# Patient Record
Sex: MALE | Race: WHITE | Employment: OTHER | URBAN - METROPOLITAN AREA
[De-identification: names, ages, dates, MRNs, and addresses within clinical notes are randomized per-mention and may not be internally consistent; named-entity substitution may affect disease eponyms.]

---

## 2018-04-03 ENCOUNTER — APPOINTMENT (OUTPATIENT)
Dept: ULTRASOUND IMAGING | Age: 78
End: 2018-04-03
Attending: INTERNAL MEDICINE
Payer: MEDICARE

## 2018-04-03 ENCOUNTER — HOSPITAL ENCOUNTER (OUTPATIENT)
Age: 78
Setting detail: OBSERVATION
Discharge: HOME | End: 2018-04-04
Attending: EMERGENCY MEDICINE | Admitting: INTERNAL MEDICINE
Payer: MEDICARE

## 2018-04-03 DIAGNOSIS — G45.9 TRANSIENT CEREBRAL ISCHEMIA, UNSPECIFIED TYPE: Primary | ICD-10-CM

## 2018-04-03 PROBLEM — R47.01 EXPRESSIVE APHASIA: Status: ACTIVE | Noted: 2018-04-03

## 2018-04-03 PROBLEM — H40.9 GLAUCOMA: Status: ACTIVE | Noted: 2018-04-03

## 2018-04-03 LAB
HGB BLD-MCNC: 14.9 G/DL (ref 13.6–17.2)
INR PPP: 1
PLATELET # BLD AUTO: 216 K/UL (ref 150–450)
PROTHROMBIN TIME: 12.7 SEC (ref 11.5–14.5)

## 2018-04-03 PROCEDURE — 93880 EXTRACRANIAL BILAT STUDY: CPT

## 2018-04-03 PROCEDURE — 85049 AUTOMATED PLATELET COUNT: CPT | Performed by: INTERNAL MEDICINE

## 2018-04-03 PROCEDURE — 99218 HC RM OBSERVATION: CPT

## 2018-04-03 PROCEDURE — 82565 ASSAY OF CREATININE: CPT | Performed by: INTERNAL MEDICINE

## 2018-04-03 PROCEDURE — 85018 HEMOGLOBIN: CPT | Performed by: INTERNAL MEDICINE

## 2018-04-03 PROCEDURE — 77030032490 HC SLV COMPR SCD KNE COVD -B

## 2018-04-03 PROCEDURE — 77030027138 HC INCENT SPIROMETER -A

## 2018-04-03 PROCEDURE — 74011250637 HC RX REV CODE- 250/637: Performed by: INTERNAL MEDICINE

## 2018-04-03 PROCEDURE — 99284 EMERGENCY DEPT VISIT MOD MDM: CPT | Performed by: EMERGENCY MEDICINE

## 2018-04-03 PROCEDURE — 85610 PROTHROMBIN TIME: CPT | Performed by: EMERGENCY MEDICINE

## 2018-04-03 RX ORDER — GUAIFENESIN 100 MG/5ML
81 LIQUID (ML) ORAL DAILY
Status: DISCONTINUED | OUTPATIENT
Start: 2018-04-04 | End: 2018-04-04 | Stop reason: HOSPADM

## 2018-04-03 RX ORDER — LATANOPROST 50 UG/ML
1 SOLUTION/ DROPS OPHTHALMIC
COMMUNITY

## 2018-04-03 RX ORDER — ACETAMINOPHEN 325 MG/1
650 TABLET ORAL
Status: DISCONTINUED | OUTPATIENT
Start: 2018-04-03 | End: 2018-04-04 | Stop reason: HOSPADM

## 2018-04-03 RX ORDER — ATORVASTATIN CALCIUM 40 MG/1
40 TABLET, FILM COATED ORAL
Status: DISCONTINUED | OUTPATIENT
Start: 2018-04-03 | End: 2018-04-04 | Stop reason: HOSPADM

## 2018-04-03 RX ORDER — BISACODYL 5 MG
5 TABLET, DELAYED RELEASE (ENTERIC COATED) ORAL DAILY PRN
Status: DISCONTINUED | OUTPATIENT
Start: 2018-04-03 | End: 2018-04-04 | Stop reason: HOSPADM

## 2018-04-03 RX ORDER — TIMOLOL MALEATE 5 MG/ML
1 SOLUTION OPHTHALMIC DAILY
COMMUNITY

## 2018-04-03 RX ORDER — HYDRALAZINE HYDROCHLORIDE 20 MG/ML
10 INJECTION INTRAMUSCULAR; INTRAVENOUS
Status: DISCONTINUED | OUTPATIENT
Start: 2018-04-03 | End: 2018-04-04 | Stop reason: HOSPADM

## 2018-04-03 RX ORDER — SODIUM CHLORIDE 0.9 % (FLUSH) 0.9 %
5-10 SYRINGE (ML) INJECTION EVERY 8 HOURS
Status: DISCONTINUED | OUTPATIENT
Start: 2018-04-03 | End: 2018-04-04 | Stop reason: HOSPADM

## 2018-04-03 RX ORDER — SODIUM CHLORIDE 0.9 % (FLUSH) 0.9 %
5-10 SYRINGE (ML) INJECTION AS NEEDED
Status: DISCONTINUED | OUTPATIENT
Start: 2018-04-03 | End: 2018-04-04 | Stop reason: HOSPADM

## 2018-04-03 RX ORDER — ENOXAPARIN SODIUM 100 MG/ML
40 INJECTION SUBCUTANEOUS EVERY 24 HOURS
Status: DISCONTINUED | OUTPATIENT
Start: 2018-04-03 | End: 2018-04-04 | Stop reason: HOSPADM

## 2018-04-03 RX ADMIN — ATORVASTATIN CALCIUM 40 MG: 40 TABLET, FILM COATED ORAL at 21:36

## 2018-04-03 RX ADMIN — Medication 5 ML: at 19:00

## 2018-04-03 RX ADMIN — Medication 10 ML: at 21:36

## 2018-04-03 NOTE — ED TRIAGE NOTES
Per ems report patient to ed from Emergency MD due to speech difficulty that started around 1140am today which resolved spontaneously. Ems states patient began having tunnel vision at emergency md without any speech issues at approximately 115pm, Patient states all symptoms have since resolved. EMS states CT scan at emergency md was normal. Patient has a 20 G IV in his right forearm. Patient denies any symptoms at this time. A results from emergency MD at bedside.

## 2018-04-03 NOTE — ED PROVIDER NOTES
HPI     This is a 76-year-old male presenting to the emergency department for evaluation of concern for TIA. The patient was seen initially at [facility] for an episode of difficulty with  Speech and inability to express the words that he was thinking. His symptoms resolved spontaneously. He was evaluated in emergency and he had a CT of his brain which was unremarkable, he had normal labs. He received a full dose aspirin. At that time they were discussing outpatient follow-up and treatment when he began having symptoms again of difficulty reading text messages and understanding the words he was looking at. This lasted for a couple of minutes as well and then resolve spontaneously. Because of the stuttering symptoms, he was sent to the emergency department for further evaluation. The patient comes to the emergency department with paperwork of his laboratory results as well as a CT scanthere are no alleviating or exacerbating symptoms. No weakness in extremities. The patient also notes that he has a remote history of being \"manic depressive\". He states earlier today he was feeling very giddy and telling a lot of jokes and feeling as though he might be manic which is not felt in 15-20 years. He states he does not know if this is pertinent or not, but this too has resolved. Past Medical History:   Diagnosis Date    Glaucoma     Pulmonary embolism (HCC)        Past Surgical History:   Procedure Laterality Date    HX CHOLECYSTECTOMY      HX TONSILLECTOMY      HX VASECTOMY           No family history on file. Social History     Social History    Marital status: N/A     Spouse name: N/A    Number of children: N/A    Years of education: N/A     Occupational History    Not on file.      Social History Main Topics    Smoking status: Never Smoker    Smokeless tobacco: Never Used    Alcohol use Yes      Comment: special occasions    Drug use: No    Sexual activity: Not on file     Other Topics Concern    Not on file     Social History Narrative    No narrative on file         ALLERGIES: Review of patient's allergies indicates no known allergies. Review of Systems   Constitutional: Negative for fatigue and fever. HENT: Negative. Eyes: Negative. Respiratory: Negative for cough, chest tightness, shortness of breath and wheezing. Cardiovascular: Negative for chest pain. Gastrointestinal: Negative for abdominal distention, abdominal pain, diarrhea, nausea and vomiting. Genitourinary: Negative for dysuria, flank pain, frequency, genital sores and urgency. Musculoskeletal: Negative. Skin: Negative for rash. Neurological: Positive for speech difficulty. Negative for dizziness, tremors, syncope, facial asymmetry, weakness and headaches. All other systems reviewed and are negative. Vitals:    04/03/18 1503   BP: 164/84   Pulse: 69   Resp: 16   Temp: 98 °F (36.7 °C)   SpO2: 98%   Weight: 99.8 kg (220 lb)   Height: 6' 2\" (1.88 m)            Physical Exam   Constitutional: He is oriented to person, place, and time. He appears well-developed and well-nourished. No distress. HENT:   Head: Normocephalic and atraumatic. Eyes: EOM are normal. Pupils are equal, round, and reactive to light. Neck: Normal range of motion. Cardiovascular: Normal rate, regular rhythm and normal heart sounds. No murmur heard. Pulmonary/Chest: Effort normal and breath sounds normal. No respiratory distress. He has no wheezes. He has no rales. Abdominal: Soft. He exhibits no distension. There is no tenderness. There is no rebound. Musculoskeletal: He exhibits no edema, tenderness or deformity. Neurological: He is alert and oriented to person, place, and time. No cranial nerve deficit. Coordination normal.   5+ strength in all distributions, sensation intactThomas finger-nose-finger intact, normal gait, normal tandem gait, negative Romberg   Skin: Skin is warm and dry. No erythema.    Psychiatric: He has a normal mood and affect. His behavior is normal.   Vitals reviewed. MDM  Number of Diagnoses or Management Options  Transient cerebral ischemia, unspecified type:   Diagnosis management comments: Differential diagnosis: Acute stroke, TIA,    Patient presents to the ER symptoms that sound most consistent with TIA with complete resolution of symptoms at this time. He has a normal neurological examination. Given his stuttering symptoms, I think he would benefit from inpatient evaluation including MRI and risk factor modification. I discussed this with the hospitalist who has agreed to admit for further treatment and care. He does present with records from outside hospital which had been placed on the chart. These show unremarkable labs and a CT of the brain which shows no evidence of hemorrhage.        Amount and/or Complexity of Data Reviewed  Decide to obtain previous medical records or to obtain history from someone other than the patient: yes    Risk of Complications, Morbidity, and/or Mortality  Presenting problems: high  Diagnostic procedures: high  Management options: high          ED Course       Procedures

## 2018-04-03 NOTE — IP AVS SNAPSHOT
Summary of Care Report The Summary of Care report has been created to help improve care coordination. Users with access to [employer] or [facility] [facility] (Web-based application) may access additional patient information including the Discharge Summary. If you are not currently a [address] user and need more information, please call the number listed below in the [place] [address] section and ask to be connected with Medical Records. Facility Information Name Address Phone [address] [address] [address] [phone] Patient Information Patient Name Sex YANIQUE Quinones ([id number]) Male 1940 Discharge Information Admitting Provider Service Area Unit Jose Kilpatrick MD / [address] 7 Med Surg / [phone] Discharge Provider Discharge Date/Time Discharge Disposition Destination (none) 2018 Midday (Pending) AHR (none) Patient Language Language ENGLISH [13] Hospital Problems as of 2018  Never Reviewed Class Noted - Resolved Last Modified POA Active Problems * (Principal)TIA (transient ischemic attack)  4/3/2018 - Present 4/3/2018 by Jose Kilpatrick MD Yes Entered by Jose Kilpatrick MD  
  Glaucoma  4/3/2018 - Present 4/3/2018 by Jose Kilpatrick MD Yes Entered by Jose Kilpatrick MD  
  Expressive aphasia  4/3/2018 - Present 4/3/2018 by Jose Kilpatrick MD Yes Entered by Jose Kilpatrick MD  
  
You are allergic to the following No active allergies Current Discharge Medication List  
  
START taking these medications Dose & Instructions Dispensing Information Comments  
 atorvastatin 40 mg tablet Commonly known as:  LIPITOR Dose:  40 mg Take 1 Tab by mouth nightly. Quantity:  30 Tab Refills:  0 CONTINUE these medications which have NOT CHANGED Dose & Instructions Dispensing Information Comments  
 aspirin 81 mg chewable tablet Dose:  81 mg Take 81 mg by mouth daily. Refills:  0  
   
 FISH  mg Cap Generic drug:  Omega-3 Fatty Acids Dose:  1 Caplet Take 1 Caplet by mouth. Refills:  0  
   
 latanoprost 0.005 % ophthalmic solution Commonly known as:  Dot Dux Dose:  1 Drop Administer 1 Drop to both eyes nightly. Refills:  0  
   
 timolol 0.5 % ophthalmic gel-forming Commonly known as:  TIMOPTIC-XE Dose:  1 Drop Administer 1 Drop to both eyes daily. Refills:  0 Follow-up Information Follow up With Details Comments Contact Info Will set up new provider In 3 days Hospital follow up and elevated blood pressure Patient will be contacted with name of provider Discharge Instructions Transient Ischemic Attack: Care Instructions Your Care Instructions A transient ischemic attack (TIA) is when blood flow to a part of your brain is blocked for a short time. A TIA is like a stroke but usually lasts only a few minutes. A TIA does not cause lasting brain damage. Any vision problems, slurred speech, or other symptoms usually go away in 10 to 20 minutes. But they may last for up to 24 hours. TIAs are often warning signs of a stroke. Some people who have a TIA may have a stroke in the future. A stroke can cause symptoms like those of a TIA. But a stroke causes lasting damage to your brain. You can take steps to help prevent a stroke. One thing you can do is get early treatment. If you have other new symptoms, or if your symptoms do not get better, go back to the emergency room or call your doctor right away. Getting treatment right away may prevent long-term brain damage caused by a stroke. The doctor has checked you carefully, but problems can develop later. If you notice any problems or new symptoms, get medical treatment right away. Follow-up care is a key part of your treatment and safety. Be sure to make and go to all appointments, and call your doctor if you are having problems. It's also a good idea to know your test results and keep a list of the medicines you take. How can you care for yourself at home? Medicines ? · Be safe with medicines. Take your medicines exactly as prescribed. Call your doctor if you think you are having a problem with your medicine. ? · If you take a blood thinner, such as aspirin, be sure you get instructions about how to take your medicine safely. Blood thinners can cause serious bleeding problems. ? · Call your doctor if you are not able to take your medicines for any reason. ? · Do not take any over-the-counter medicines or herbal products without talking to your doctor first.  
? · If you take birth control pills or hormone therapy, talk to your doctor. Ask if these treatments are right for you. ? Lifestyle changes ? · Do not smoke. If you need help quitting, talk to your doctor about stop-smoking programs and medicines. ? · Be active. If your doctor recommends it, get more exercise. Walking is a good choice. Bit by bit, increase the amount you walk every day. Try for at least 30 minutes on most days of the week. You also may want to swim, bike, or do other activities. ? · Eat heart-healthy foods. These include fruits, vegetables, high-fiber foods, fish, and foods that are low in sodium, saturated fat, and trans fat. ? · Stay at a healthy weight. Lose weight if you need to.  
? · Limit alcohol to 2 drinks a day for men and 1 drink a day for women. ?Staying healthy ? · Manage other health problems such as diabetes, high blood pressure, and high cholesterol. ? · Get the flu vaccine every year. When should you call for help? Call 911 anytime you think you may need emergency care. For example, call if: 
? · You have new or worse symptoms of a stroke. These may include: ¨ Sudden numbness, tingling, weakness, or loss of movement in your face, arm, or leg, especially on only one side of your body. ¨ Sudden vision changes. ¨ Sudden trouble speaking. ¨ Sudden confusion or trouble understanding simple statements. ¨ Sudden problems with walking or balance. ¨ A sudden, severe headache that is different from past headaches. Call 911 even if these symptoms go away in a few minutes. ? · You feel like you are having another TIA. ? Watch closely for changes in your health, and be sure to contact your doctor if you have any problems. Where can you learn more? Go to http://[employer].info/. Enter (01) [phone] in the search box to learn more about \"Transient Ischemic Attack: Care Instructions. \" Current as of: March 20, 2017 Content Version: 11.4 © 1989-0736 [employer]. Care instructions adapted under license by [employer] (which disclaims liability or warranty for this information). If you have questions about a medical condition or this instruction, always ask your healthcare professional. [place] [address] any warranty or liability for your use of this information. DISCHARGE SUMMARY from Nurse PATIENT INSTRUCTIONS: 
 
 
F-face looks uneven A-arms unable to move or move unevenly S-speech slurred or non-existent T-time-call 911 as soon as signs and symptoms begin-DO NOT go Back to bed or wait to see if you get better-TIME IS BRAIN. Warning Signs of HEART ATTACK Call 911 if you have these symptoms: 
? Chest discomfort. Most heart attacks involve discomfort in the center of the chest that lasts more than a few minutes, or that goes away and comes back. It can feel like uncomfortable pressure, squeezing, fullness, or pain. ? Discomfort in other areas of the upper body. Symptoms can include pain or discomfort in one or both arms, the back, neck, jaw, or stomach. ? Shortness of breath with or without chest discomfort. ? Other signs may include breaking out in a cold sweat, nausea, or lightheadedness. Don't wait more than five minutes to call [address]! Fast action can save your life. Calling 911 is almost always the fastest way to get lifesaving treatment. Emergency Medical Services staff can begin treatment when they arrive  up to an hour sooner than if someone gets to the hospital by car. The discharge information has been reviewed with the patient. The patient verbalized understanding. Discharge medications reviewed with the patient and appropriate educational materials and side effects teaching were provided. ___________________________________________________________________________________________________________________________________ Chart Review Routing History No Routing History on File

## 2018-04-03 NOTE — IP AVS SNAPSHOT
[address] 
[phone] Patient: Dangelo White MRN: [id number] YXT:[id number] About your hospitalization You were admitted on:  April 3, 2018 You last received care in the:  [facility] 7 MED SURG You were discharged on:  April 4, 2018 Why you were hospitalized Your primary diagnosis was:  Tia (Transient Ischemic Attack) Your diagnoses also included:  Glaucoma, Expressive Aphasia Follow-up Information Follow up With Details Comments Contact Info Will set up new provider In 3 days Hospital follow up and elevated blood pressure Patient will be contacted with name of provider Discharge Orders None A check alida indicates which time of day the medication should be taken. My Medications START taking these medications Instructions Each Dose to Equal  
 Morning Noon Evening Bedtime  
 atorvastatin 40 mg tablet Commonly known as:  LIPITOR Your next dose is: Take tonight Take 1 Tab by mouth nightly. 40 mg CONTINUE taking these medications Instructions Each Dose to Equal  
 Morning Noon Evening Bedtime  
 aspirin 81 mg chewable tablet Your next dose is:  Tomorrow Morning Take 81 mg by mouth daily. 81 mg FISH  mg Cap Generic drug:  Omega-3 Fatty Acids Your next dose is:  Tomorrow Morning Take 1 Caplet by mouth. 1 Caplet  
    
  
   
   
   
  
 latanoprost 0.005 % ophthalmic solution Commonly known as:  Marlyse Billow Your next dose is: Take tonight Administer 1 Drop to both eyes nightly. 1 Drop  
    
   
   
   
  
  
 timolol 0.5 % ophthalmic gel-forming Commonly known as:  TIMOPTIC-XE Your next dose is:  Resume home schedule Administer 1 Drop to both eyes daily. 1 Drop Where to Get Your Medications Information on where to get these meds will be given to you by the nurse or doctor. ! Ask your nurse or doctor about these medications  
  atorvastatin 40 mg tablet Discharge Instructions Transient Ischemic Attack: Care Instructions Your Care Instructions A transient ischemic attack (TIA) is when blood flow to a part of your brain is blocked for a short time. A TIA is like a stroke but usually lasts only a few minutes. A TIA does not cause lasting brain damage. Any vision problems, slurred speech, or other symptoms usually go away in 10 to 20 minutes. But they may last for up to 24 hours. TIAs are often warning signs of a stroke. Some people who have a TIA may have a stroke in the future. A stroke can cause symptoms like those of a TIA. But a stroke causes lasting damage to your brain. You can take steps to help prevent a stroke. One thing you can do is get early treatment. If you have other new symptoms, or if your symptoms do not get better, go back to the emergency room or call your doctor right away. Getting treatment right away may prevent long-term brain damage caused by a stroke. The doctor has checked you carefully, but problems can develop later. If you notice any problems or new symptoms, get medical treatment right away. Follow-up care is a key part of your treatment and safety. Be sure to make and go to all appointments, and call your doctor if you are having problems. It's also a good idea to know your test results and keep a list of the medicines you take. How can you care for yourself at home? Medicines ? · Be safe with medicines. Take your medicines exactly as prescribed. Call your doctor if you think you are having a problem with your medicine. ? · If you take a blood thinner, such as aspirin, be sure you get instructions about how to take your medicine safely. Blood thinners can cause serious bleeding problems. ? · Call your doctor if you are not able to take your medicines for any reason. ? · Do not take any over-the-counter medicines or herbal products without talking to your doctor first.  
? · If you take birth control pills or hormone therapy, talk to your doctor. Ask if these treatments are right for you. ? Lifestyle changes ? · Do not smoke. If you need help quitting, talk to your doctor about stop-smoking programs and medicines. ? · Be active. If your doctor recommends it, get more exercise. Walking is a good choice. Bit by bit, increase the amount you walk every day. Try for at least 30 minutes on most days of the week. You also may want to swim, bike, or do other activities. ? · Eat heart-healthy foods. These include fruits, vegetables, high-fiber foods, fish, and foods that are low in sodium, saturated fat, and trans fat. ? · Stay at a healthy weight. Lose weight if you need to.  
? · Limit alcohol to 2 drinks a day for men and 1 drink a day for women. ?Staying healthy ? · Manage other health problems such as diabetes, high blood pressure, and high cholesterol. ? · Get the flu vaccine every year. When should you call for help? Call 911 anytime you think you may need emergency care. For example, call if: 
? · You have new or worse symptoms of a stroke. These may include: 
¨ Sudden numbness, tingling, weakness, or loss of movement in your face, arm, or leg, especially on only one side of your body. ¨ Sudden vision changes. ¨ Sudden trouble speaking. ¨ Sudden confusion or trouble understanding simple statements. ¨ Sudden problems with walking or balance. ¨ A sudden, severe headache that is different from past headaches. Call 911 even if these symptoms go away in a few minutes. ? · You feel like you are having another TIA. ? Watch closely for changes in your health, and be sure to contact your doctor if you have any problems. Where can you learn more? Go to http://rhett.info/. Enter [phone] in the search box to learn more about \"Transient Ischemic Attack: Care Instructions. \" Current as of: March 20, 2017 Content Version: 11.4 © 5101-6981 [employer]. Care instructions adapted under license by [employer] (which disclaims liability or warranty for this information). If you have questions about a medical condition or this instruction, always ask your healthcare professional. [address] any warranty or liability for your use of this information. DISCHARGE SUMMARY from Nurse PATIENT INSTRUCTIONS: 
 
 
F-face looks uneven A-arms unable to move or move unevenly S-speech slurred or non-existent T-time-call 911 as soon as signs and symptoms begin-DO NOT go Back to bed or wait to see if you get better-TIME IS BRAIN. Warning Signs of HEART ATTACK Call 911 if you have these symptoms: 
? Chest discomfort. Most heart attacks involve discomfort in the center of the chest that lasts more than a few minutes, or that goes away and comes back. It can feel like uncomfortable pressure, squeezing, fullness, or pain. ? Discomfort in other areas of the upper body. Symptoms can include pain or discomfort in one or both arms, the back, neck, jaw, or stomach. ? Shortness of breath with or without chest discomfort. ? Other signs may include breaking out in a cold sweat, nausea, or lightheadedness. Don't wait more than five minutes to call [address]! Fast action can save your life. Calling 911 is almost always the fastest way to get lifesaving treatment. Emergency Medical Services staff can begin treatment when they arrive  up to an hour sooner than if someone gets to the hospital by car. The discharge information has been reviewed with the patient. The patient verbalized understanding. Discharge medications reviewed with the patient and appropriate educational materials and side effects teaching were provided. ___________________________________________________________________________________________________________________________________ [employer] Announcement We are excited to announce that we are making your provider's discharge notes available to you in MicroPhage. You will see these notes when they are completed and signed by the physician that discharged you from your recent hospital stay. If you have any questions or concerns about any information you see in MicroPhage, please call the Health Information Department where you were seen or reach out to your Primary Care Provider for more information about your plan of care. Introducing [facility]! Leta Gonzalez introduces MicroPhage patient portal. Now you can access parts of your medical record, email your doctor's office, and request medication refills online. 1. In your internet browser, go to [email or web address] 2. Click on the First Time User? Click Here link in the Sign In box. You will see the New Member Sign Up page. 3. Enter your MicroPhage Access Code exactly as it appears below. You will not need to use this code after youve completed the sign-up process. If you do not sign up before the expiration date, you must request a new code. · MicroPhage Access Code: [email or web address] Expires: 7/2/2018  2:59 PM 
 
4. Enter the last four digits of your Social Security Number (xxxx) and Date of Birth (mm/dd/yyyy) as indicated and click Submit. You will be taken to the next sign-up page. 5. Create a MicroPhage ID. This will be your MyChart login ID and cannot be changed, so think of one that is secure and easy to remember. 6. Create a MicroPhage password. You can change your password at any time. 7. Enter your Password Reset Question and Answer. This can be used at a later time if you forget your password. 8. Enter your e-mail address. You will receive e-mail notification when new information is available in [address] 9. Click Sign Up. You can now view and download portions of your medical record. 10. Click the Download Summary menu link to download a portable copy of your medical information. If you have questions, please visit the Frequently Asked Questions section of the [employer] website. Remember, [employer] is NOT to be used for urgent needs. For medical emergencies, dial 911. Now available from your iPhone and Android! Introducing Carlos López As a [employer] patient, I wanted to make you aware of our electronic visit tool called Carlos Rene. Diane Condon 24[employer] allows you to connect within minutes with a medical provider 24 hours a day, seven days a week via a mobile device or tablet or logging into a secure website from your computer. You can access Carlos López from anywhere in the [place]. A virtual visit might be right for you when you have a simple condition and feel like you just dont want to get out of bed, or cant get away from work for an appointment, when your regular Diane Anesco provider is not available (evenings, weekends or holidays), or when youre out of town and need minor care. Electronic visits cost only $49 and if the [email or web address] provider determines a prescription is needed to treat your condition, one can be electronically transmitted to a nearby pharmacy*. Please take a moment to enroll today if you have not already done so. The enrollment process is free and takes just a few minutes. To enroll, please download the Diane Condon [employer] juice to your tablet or phone, or visit [email or web address] to enroll on your computer.    
And, as an [address] [place] [address] patient with a [employer] account, the results of your visits will be scanned into your electronic medical record and your primary care provider will be able to view the scanned results. We urge you to continue to see your regular [address] [place] [address] provider for your ongoing medical care. And while your primary care provider may not be the one available when you seek a Carlos López virtual visit, the peace of mind you get from getting a real diagnosis real time can be priceless. For more information on Carlos Linkagecaludiafin, view our Frequently Asked Questions (FAQs) at [email or web address]. Sincerely, 
 
Bry Zimmerman MD 
Chief Medical Officer [employer] *:  certain medications cannot be prescribed via Mediastay Unresulted Labs-Please follow up with your PCP about these lab tests Order Current Status HEMOGLOBIN A1C WITH EAG In process Providers Seen During Your Hospitalization Provider Specialty Primary office phone Kristie Gotltieb MD Emergency Medicine [phone] Krai Jones MD Internal Medicine [phone] Your Primary Care Physician (PCP) Primary Care Physician Office Phone Office Fax UNKNOWN, PROVIDER ** None ** ** None ** You are allergic to the following No active allergies Recent Documentation Height Weight BMI Smoking Status 1.88 m 99.8 kg 28.25 kg/m2 Never Smoker Emergency Contacts Name Discharge Info Relation Home Work Mobile Charlene Jensen [21] [phone] Patient Belongings The following personal items are in your possession at time of discharge: 
                   Clothing: At bedside    Other Valuables: Cell Phone, At bedside Please provide this summary of care documentation to your next provider. Signatures-by signing, you are acknowledging that this After Visit Summary has been reviewed with you and you have received a copy. Patient Signature:  ____________________________________________________________ Date:  ____________________________________________________________  
  
Pily Lambing Provider Signature:  ____________________________________________________________ Date:  ____________________________________________________________

## 2018-04-03 NOTE — PROGRESS NOTES
TRANSFER - IN REPORT:    Verbal report received from Louise(name) on Dangelo White  being received from ED(unit) for routine progression of care      Report consisted of patients Situation, Background, Assessment and   Recommendations(SBAR). Information from the following report(s) SBAR, ED Summary and Recent Results was reviewed with the receiving nurse. Opportunity for questions and clarification was provided. Assessment completed upon patients arrival to unit and care assumed.

## 2018-04-03 NOTE — H&P
HOSPITALIST H&P/CONSULT  NAME:  Vianca Chandra   Age:  69 y.o.  :   1940   MRN:   [id number]  PCP: PROVIDER Samanta Medina  Consulting MD:  Treatment Team: Attending Provider: Brannon Harrell MD  HPI:     69 M with PMH of glaucoma and Remote history of Seizures and PE was sent to ER from Emergency [facility] for TIA/Stroke workup. Pt Reports he woke up fine but later in the day started having trouble forming words. Went to  and Ct head, EKG and Labs were unremarkable. Given asa 325mg and later on Speech issues resolved. He was about to be discharged to home but the he experienced difficulty typing message on his phone and developed tunneled vision in Rt eye so he was sent to ER. EKG was done and I have reviewed that showed SR 96bpm. His symptoms resolved completely in ER. Hospitalist asked to admit for TIA/stroke workup. Pt states he feels fine now, no speech difficulty, no new weakness or numbness, vision back to baseline. Reports he had seizures diagnosed decades ago. Also had PE in past over 15 years ago and was never started on [facility], instead took only aspirin. 10 point ROS done and is negative except as noted in HPI. Past Medical History:   Diagnosis Date    Glaucoma     Pulmonary embolism (HCC)       Past Surgical History:   Procedure Laterality Date    HX CHOLECYSTECTOMY      HX TONSILLECTOMY      HX VASECTOMY        Prior to Admission Medications   Prescriptions Last Dose Informant Patient Reported? Taking? Omega-3 Fatty Acids (FISH OIL) 500 mg cap   Yes Yes   Sig: Take 1 Caplet by mouth.   latanoprost (XALATAN) 0.005 % ophthalmic solution   Yes Yes   Sig: Administer 1 Drop to both eyes nightly. timolol (TIMOPTIC-XE) 0.5 % ophthalmic gel-forming   Yes Yes   Sig: Administer 1 Drop to both eyes daily. Facility-Administered Medications: None     Home meds reconciled.   No Known Allergies   Social History   Substance Use Topics    Smoking status: Never Smoker    Smokeless tobacco: Never Used    Alcohol use Yes      Comment: special occasions      Family History   Problem Relation Age of Onset    No Known Problems Mother     No Known Problems Father         There is no immunization history on file for this patient. Objective:     Visit Vitals    /83    Pulse 62    Temp 98 °F (36.7 °C)    Resp 22    Ht 6' 2\" (1.88 m)    Wt 99.8 kg (220 lb)    SpO2 95%    BMI 28.25 kg/m2      Temp (24hrs), Av °F (36.7 °C), Min:98 °F (36.7 °C), Max:98 °F (36.7 °C)    Oxygen Therapy  O2 Sat (%): 95 % (18 1632)  Pulse via Oximetry: 62 beats per minute (18 1632)  O2 Device: Room air (18 1503)  Physical Exam:  General:    Alert, cooperative, no distress   Head:   NCAT. No obvious deformity  Nose:  Nares normal. No drainage  Lungs:   CTABL. No wheezing/rhonchi/rales  Heart:   RRR. No m/r/g. Abdomen:   S/nt/nd. Bowel sounds normal.   Extremities: No cyanosis. Skin:     No rashes or lesions. Not Jaundiced  Neurologic: Moves all extremities. no gross focal deficits      Data Review:   Recent Results (from the past 24 hour(s))   PROTHROMBIN TIME + INR    Collection Time: 18  4:04 PM   Result Value Ref Range    Prothrombin time 12.7 11.5 - 14.5 sec    INR 1.0       Imaging /Procedures /Studies:  I personally reviewed all labs, imaging, and other studies this admission:  CXR Results  (Last 48 hours)    None        CT Results  (Last 48 hours)    None          Assessment and Plan: Active Hospital Problems    Diagnosis Date Noted    TIA (transient ischemic attack) 2018    Glaucoma 2018    Expressive aphasia 2018       PLAN    · Admit to Obs, remote tele bed. · Get MRI brain, carotid doppler. Will defer TTE for now, may get echo if MRI is +ve. · Start on asa, Statin  · Permissive HTN.   · Check Lipds, A1c in am  · Monitor for arrythmias  · Consider CTA head and Neck if carotid doppler is abnormal.   · I have personally reviewed CBC, BMP, LFTs, CT head and EKG from Emergency MD and all these were unremarkable. · Resume home eye drops. FEN:  Cardiac diet  DVT ppx:  Lovenox  Code status:  Full Code  Estimated LOS:  Less than 48 hours.  Hopefully DC in am once workup is complete  PT/OT eval in am.  Plan of care discussed with: patient     Signed By: Ronaldo Hobson MD     April 3, 2018

## 2018-04-04 ENCOUNTER — APPOINTMENT (OUTPATIENT)
Dept: MRI IMAGING | Age: 78
End: 2018-04-04
Attending: INTERNAL MEDICINE
Payer: MEDICARE

## 2018-04-04 VITALS
SYSTOLIC BLOOD PRESSURE: 145 MMHG | TEMPERATURE: 98.4 F | HEIGHT: 74 IN | OXYGEN SATURATION: 96 % | BODY MASS INDEX: 28.23 KG/M2 | WEIGHT: 220 LBS | HEART RATE: 64 BPM | DIASTOLIC BLOOD PRESSURE: 92 MMHG | RESPIRATION RATE: 16 BRPM

## 2018-04-04 LAB
CHOLEST SERPL-MCNC: 181 MG/DL
CREAT SERPL-MCNC: 0.8 MG/DL (ref 0.8–1.5)
EST. AVERAGE GLUCOSE BLD GHB EST-MCNC: 114 MG/DL
HBA1C MFR BLD: 5.6 % (ref 4.8–6)
HDLC SERPL-MCNC: 51 MG/DL (ref 40–60)
HDLC SERPL: 3.5 {RATIO}
LDLC SERPL CALC-MCNC: 103.2 MG/DL
LIPID PROFILE,FLP: ABNORMAL
TRIGL SERPL-MCNC: 134 MG/DL (ref 35–150)
VLDLC SERPL CALC-MCNC: 26.8 MG/DL (ref 6–23)

## 2018-04-04 PROCEDURE — G9165 ATTEN CURRENT STATUS: HCPCS

## 2018-04-04 PROCEDURE — 99218 HC RM OBSERVATION: CPT

## 2018-04-04 PROCEDURE — 83036 HEMOGLOBIN GLYCOSYLATED A1C: CPT | Performed by: INTERNAL MEDICINE

## 2018-04-04 PROCEDURE — 96125 COGNITIVE TEST BY HC PRO: CPT

## 2018-04-04 PROCEDURE — G8980 MOBILITY D/C STATUS: HCPCS

## 2018-04-04 PROCEDURE — G9167 ATTEN D/C STATUS: HCPCS

## 2018-04-04 PROCEDURE — 74011000250 HC RX REV CODE- 250: Performed by: INTERNAL MEDICINE

## 2018-04-04 PROCEDURE — 74011250637 HC RX REV CODE- 250/637: Performed by: INTERNAL MEDICINE

## 2018-04-04 PROCEDURE — G8978 MOBILITY CURRENT STATUS: HCPCS

## 2018-04-04 PROCEDURE — 92523 SPEECH SOUND LANG COMPREHEN: CPT

## 2018-04-04 PROCEDURE — G8979 MOBILITY GOAL STATUS: HCPCS

## 2018-04-04 PROCEDURE — 36415 COLL VENOUS BLD VENIPUNCTURE: CPT | Performed by: INTERNAL MEDICINE

## 2018-04-04 PROCEDURE — G9166 ATTEN GOAL STATUS: HCPCS

## 2018-04-04 PROCEDURE — 80061 LIPID PANEL: CPT | Performed by: INTERNAL MEDICINE

## 2018-04-04 PROCEDURE — 70551 MRI BRAIN STEM W/O DYE: CPT

## 2018-04-04 PROCEDURE — 97161 PT EVAL LOW COMPLEX 20 MIN: CPT

## 2018-04-04 RX ORDER — ASPIRIN 325 MG
325 TABLET ORAL DAILY
Status: ON HOLD | COMMUNITY
End: 2018-04-04 | Stop reason: CLARIF

## 2018-04-04 RX ORDER — TIMOLOL MALEATE 5 MG/ML
1 SOLUTION/ DROPS OPHTHALMIC DAILY
Status: DISCONTINUED | OUTPATIENT
Start: 2018-04-04 | End: 2018-04-04 | Stop reason: HOSPADM

## 2018-04-04 RX ORDER — ATORVASTATIN CALCIUM 40 MG/1
40 TABLET, FILM COATED ORAL
Qty: 30 TAB | Refills: 0 | Status: SHIPPED | OUTPATIENT
Start: 2018-04-04

## 2018-04-04 RX ORDER — GUAIFENESIN 100 MG/5ML
81 LIQUID (ML) ORAL DAILY
COMMUNITY

## 2018-04-04 RX ORDER — LATANOPROST 50 UG/ML
1 SOLUTION/ DROPS OPHTHALMIC
Status: DISCONTINUED | OUTPATIENT
Start: 2018-04-04 | End: 2018-04-04 | Stop reason: HOSPADM

## 2018-04-04 RX ORDER — TIMOLOL MALEATE 5 MG/ML
1 SOLUTION OPHTHALMIC DAILY
Status: DISCONTINUED | OUTPATIENT
Start: 2018-04-04 | End: 2018-04-04 | Stop reason: CLARIF

## 2018-04-04 RX ORDER — LORAZEPAM 2 MG/ML
1 INJECTION INTRAMUSCULAR ONCE
Status: DISCONTINUED | OUTPATIENT
Start: 2018-04-04 | End: 2018-04-04 | Stop reason: HOSPADM

## 2018-04-04 RX ADMIN — Medication 10 ML: at 05:24

## 2018-04-04 RX ADMIN — TIMOLOL MALEATE 1 DROP: 5 SOLUTION/ DROPS OPHTHALMIC at 10:22

## 2018-04-04 RX ADMIN — ASPIRIN 81 MG 81 MG: 81 TABLET ORAL at 08:03

## 2018-04-04 NOTE — PROGRESS NOTES
Problem: TIA/CVA Stroke: 0-24 hours  Goal: Medications  Outcome: Progressing Towards Goal  Medications given and education given    Goal: *Absence of deep venous thrombosis signs and symptoms(Stroke Metric)  Outcome: Progressing Towards Goal  Patient educated and is wearing his SCD's as ordered.

## 2018-04-04 NOTE — PROGRESS NOTES
Speech language pathology: OBSERVATION BEDSIDE SWALLOWING ANDSpeech-language and cognitive note: Initial Assessment and Discharge    NAME/AGE/GENDER: Juan Diego Murrieta is a 69 y.o. male  DATE: 4/4/2018  PRIMARY DIAGNOSIS: TIA (transient ischemic attack)       ICD-10: Treatment Diagnosis: COGNITIVE COMMUNIATION IMPAIRMENT r41.841  INTERDISCIPLINARY COLLABORATION: n/aASSESSMENT:Based on the objective data described below, Mr. Barrie Heimlich presents with a swallow within functional limits. Patient admitted with transient aphasia which has now resolved. Patient denies residual deficits. MRI negative. Patient observed with his coffee and peanut butter crackers. No overt signs/sx of aspiration. Normal mastication time and initiation of the swallow. Patient provided with [facility] Cognitive Assessment which he completed in less than 10 minutes with score of 30/30. Named over 20 items in one minute with the average being 11. He is very talkative with no word finding deficits appreciated. Recommend continue cardiac diet/thin liquids. No further speech therapy is indicated at this time. ?????? ? ? This section established at most recent assessment??????????  PROBLEM LIST (Impairments causing functional limitations):  1. Transient aphasia  REHABILITATION POTENTIAL FOR STATED GOALS: n/a    PLAN OF CARE:   Patient will benefit from skilled intervention to address the following impairments. INTERVENTIONS PLANNED: (Benefits and precautions of therapy have been discussed with the patient.)  1.  n./a  FREQUENCY/DURATION: Will not continue to follow patient to address above goals. RECOMMENDED REHABILITATION/EQUIPMENT: (at time of discharge pending progress): Due to the probability of continued deficits (see above) this patient will not likely need continued skilled speech therapy after discharge. SUBJECTIVE:   Cooperative.   History of Present Injury/Illness: Mr. Barrie Heimlich  has a past medical history of Glaucoma and Pulmonary embolism ([facility] He also  has a past surgical history that includes hx cholecystectomy; hx vasectomy; and hx tonsillectomy. Present Symptoms: n/a   Pain Intensity 1: 0  Current Medications:   No current facility-administered medications on file prior to encounter. No current outpatient prescriptions on file prior to encounter.      Current Dietary Status: regular textures    Social History/Home Situation: home alone   Home Environment: Private residence  One/Two Story Residence: One story  Living Alone: Yes  Support Systems: Family member(s)  Patient Expects to be Discharged to[de-identified] Private residence  Current DME Used/Available at Home: None  Work/Activity History: retired ;   OBJECTIVE:   Oral Motor Structure/Speech:  Oral-Motor Structure/Motor Speech  Labial: No impairment  Dentition: Natural  Lingual: No impairment    SPEECH-LANGUAGE COGNITIVE EVALUATION  Tests Given:MOCA    Mental Status:  Neurologic State: Alert  Orientation Level: Oriented X4  Cognition: Appropriate decision making  Perception: Appears intact     Safety/Judgement: Awareness of environment    Motor Speech:  Oral-Motor Structure/Motor Speech  Labial: No impairment  Dentition: Natural  Lingual: No impairment    Auditory Comprehension:        Reading Comprehension:   Reading Comprehension  Visual Impairment: Glasses/contacts  Scanning/Tracking : No impairment    Verbal Expression:        Written Expression:   Written Expression  Legibility : No impairment     MOCA;: 30/30       Pragmatics:  Pragmatics Impairment: No impairment  Pragmatics Impairment Severity: None    Assessment/Reassessment only, no treatment provided today    Tool Used: Functional [place] Measure (FIMTM)   Score Comments   Eating       Comprehension       Expression       Social Interaction       Problem Solving  7     Memory          Score:  Initial: 7 Most Recent: X (Date: -- )   Interpretation of Tool: Provides a uniform system of measurement for disability based on the International Classification of Impairment, Disabilities and Handicaps; measures the level of a patient's disability and indicates how much assistance is required for the individual to carry out activities of daily living. Score 7 6 5 4 3 2 1   Modifier CH CI CJ CK CL CM CN   ?  Attention:     - CURRENT STATUS: CH - 0% impaired, limited or restricted    - GOAL STATUS:  CH - 0% impaired, limited or restricted    - D/C STATUS:  CH - 0% impaired, limited or restricted  [place] / [place] / Product Type: Medicare /   __________________________________________________________________________________________________  Safety:   After treatment position/precautions:  · awaiting his friend to visit    Total Treatment Duration:  Time In: 1030  Time Out: [address] [place], CCC-SLP

## 2018-04-04 NOTE — PROGRESS NOTES
Discharge instructions and prescriptions given and explained to pt. Pt verbalized understanding. Medication side effects sheet reviewed with pt. Pt to be discharged home by yellow cab.

## 2018-04-04 NOTE — DISCHARGE SUMMARY
Hospitalist Discharge Summary     Admit Date:  4/3/2018  2:55 PM   Name:  Cortney Howe   Age:  69 y.o.  :  1940   MRN:  [id number]   PCP:  PROVIDER UNKNOWN  Treatment Team: Attending Provider: Neill Osgood, MD; Utilization Review: Jessica Abbiburton    Problem List for this Hospitalization:  Hospital Problems as of 2018  Never Reviewed          Codes Class Noted - Resolved POA    * (Principal)TIA (transient ischemic attack) ICD-10-CM: G45.9  ICD-9-CM: 435.9  4/3/2018 - Present Yes        Glaucoma ICD-10-CM: H40.9  ICD-9-CM: 365.9  4/3/2018 - Present Yes        Expressive aphasia ICD-10-CM: R47.01  ICD-9-CM: 784.3  4/3/2018 - Present Yes                Admission HPI from 4/3/2018:    69 M with PMH of glaucoma and Remote history of Seizures and PE was sent to ER from Emergency MD for TIA/Stroke workup. Pt Reports he woke up fine but later in the day started having trouble forming words. Went to  and Ct head, EKG and Labs were unremarkable. Given asa 325mg and later on Speech issues resolved. He was about to be discharged to home but the he experienced difficulty typing message on his phone and developed tunneled vision in Rt eye so he was sent to ER. EKG was done and I have reviewed that showed SR 96bpm. His symptoms resolved completely in ER. Hospitalist asked to admit for TIA/stroke workup. Pt states he feels fine now, no speech difficulty, no new weakness or numbness, vision back to baseline. Reports he had seizures diagnosed decades ago. Also had PE in past over 15 years ago and was never started on [facility], instead took only aspirin. Hospital Course:  Patient symptom free during admission. Neuro checks wnl. Patient denies dizziness, weakness, numbness, or vision problems. Carotid US negative -   No hemodynamically significant stenosis by NASCET criteria. MRI No acute infarction. Minimal nonspecific white matter changes,nonspecific, likely chronic small vessel disease.  Blood pressure elevated -168, DBP 69-92. Lovenox, lipitor, and ASA administered during visit. Patient discharged on lipitor and ASA. CM to assist patient with securing a primary care provider for hospital follow up and blood pressure management. Follow up instructions and discharge meds at bottom of this note. Plan was discussed with patient. All questions answered. Patient was stable at time of discharge. Diagnostic Imaging/Tests:   Mri Brain Wo Cont    Result Date: 4/4/2018  MRI BRAIN WITHOUT CONTRAST. HISTORY: Transient cerebral ischemia. COMPARISON:  None. Study performed within 24 hours of admission. TECHNIQUE:  Sagittal T1, axial T1, T2, FLAIR, gradient echo, diffusion with ADC map and coronal FLAIR. FINDINGS:  Diffusion images do not demonstrate any areas of restricted diffusion to suggest acute infarction. Mild symmetric atrophy. No midline shift, mass or mass effect. Gradient echo images are unremarkable. A few scattered nonspecific white matter hyperintensities. No evidence of acute hemorrhage. The lateral ventricles are normal size. The pituitary, parasellar and midline structures are unremarkable on the sagittal T1 images. There are normal T2 flow-voids in the major vessels. Posterior fossa structures are unremarkable. The basal ganglia appear symmetric. Orbits are grossly normal.  Paranasal sinuses are clear. IMPRESSION: No acute infarction. Minimal nonspecific white matter changes, nonspecific, likely chronic small vessel disease. Duplex Carotid Bilateral    Result Date: 4/4/2018  CAROTID ULTRASOUND 4/3/2018 5:23 PM CLINICAL INFORMATION: 69-year-old with history of smoking. Visual disturbance with carotid bruit. History of dyslipidemia, hypertension, speech disturbance. COMPARISON: None available. FINDINGS: Grayscale and color Doppler evaluation was performed arteries of the neck.  Right common carotid velocities are as follows: Proximal velocity = 62.5 cm/sec Distal velocity = 31.2 cm/sec Velocity at the bulb = 52.0 cm/sec Internal carotid artery velocities are as follows: Proximal ICA = 43.3 cm/sec Mid ICA = 54.6 cm/sec Distal ICA = 61.6 cm/sec ICA/CCA ratio = 1.0 External carotid artery is patent. The right vertebral artery flow is antegrade. Grayscale images demonstrate minimal intimal thickening in the common carotid artery and proximal ICA. Left common carotid velocities are as follows: Proximal velocity = 125.4 cm/sec Distal velocity = 73.3 cm/sec Velocity at the bulb = 57.7 cm/sec Internal carotid artery velocities are as follows: Proximal ICA = 59.0 cm/sec Mid ICA = 45.9 cm/sec Distal ICA = 85.0 cm/sec ICA/CCA ratio = 1.2 External carotid artery is patent. The left vertebral artery flow is antegrade. Grayscale images demonstrate minimal intimal thickening in the common carotid artery. IMPRESSION: 1. No hemodynamically significant stenosis by NASCET criteria. Echocardiogram results:  No results found for this visit on 04/03/18. All Micro Results     None          Labs: Results:       BMP, Mg, Phos Recent Labs      04/03/18   1603   CREA  0.80      CBC Recent Labs      04/03/18   1603   HGB  14.9   PLT  216      LFT No results for input(s): SGOT, ALT, TBIL, AP, TP, ALB, GLOB, AGRAT, GPT in the last 72 hours.    Cardiac Testing No results found for: BNPP, BNP, CPK, RCK1, RCK2, RCK3, RCK4, CKMB, CKNDX, CKND1, TROPT, TROIQ   Coagulation Tests Lab Results   Component Value Date/Time    Prothrombin time 12.7 04/03/2018 04:04 PM    INR 1.0 04/03/2018 04:04 PM      A1c No results found for: HBA1C, HGBE8, RCC2XQKA   Lipid Panel Lab Results   Component Value Date/Time    Cholesterol, total 181 04/04/2018 05:49 AM    HDL Cholesterol 51 04/04/2018 05:49 AM    LDL, calculated 103.2 (H) 04/04/2018 05:49 AM    VLDL, calculated 26.8 (H) 04/04/2018 05:49 AM    Triglyceride 134 04/04/2018 05:49 AM    CHOL/HDL Ratio 3.5 04/04/2018 05:49 AM      Thyroid Panel No results found for: TSH, T4, FT4, TT3, T3U, TSHEXT     Most Recent UA No results found for: COLOR, APPRN, REFSG, ERIC, PROTU, GLUCU, KETU, BILU, BLDU, UROU, JUANCARLOS, LEUKU     No Known Allergies    There is no immunization history on file for this patient.     All Labs from Last 24 Hrs:  Recent Results (from the past 24 hour(s))   CREATININE    Collection Time: 04/03/18  4:03 PM   Result Value Ref Range    Creatinine 0.80 0.8 - 1.5 MG/DL   PLATELET COUNT    Collection Time: 04/03/18  4:03 PM   Result Value Ref Range    PLATELET 528 464 - 484 K/uL   HEMOGLOBIN    Collection Time: 04/03/18  4:03 PM   Result Value Ref Range    HGB 14.9 13.6 - 17.2 g/dL   PROTHROMBIN TIME + INR    Collection Time: 04/03/18  4:04 PM   Result Value Ref Range    Prothrombin time 12.7 11.5 - 14.5 sec    INR 1.0     LIPID PANEL    Collection Time: 04/04/18  5:49 AM   Result Value Ref Range    LIPID PROFILE          Cholesterol, total 181 <200 MG/DL    Triglyceride 134 35 - 150 MG/DL    HDL Cholesterol 51 40 - 60 MG/DL    LDL, calculated 103.2 (H) <100 MG/DL    VLDL, calculated 26.8 (H) 6.0 - 23.0 MG/DL    CHOL/HDL Ratio 3.5         Discharge Exam:  Patient Vitals for the past 24 hrs:   Temp Pulse Resp BP SpO2   04/04/18 0800 99.4 °F (37.4 °C) 80 16 (!) 153/97 95 %   04/04/18 0400 97.7 °F (36.5 °C) (!) 59 16 117/78 95 %   04/04/18 0000 99.8 °F (37.7 °C) 69 17 113/69 94 %   04/03/18 1913 97.5 °F (36.4 °C) 76 18 (!) 168/92 96 %   04/03/18 1815 98.5 °F (36.9 °C) 64 20 (!) 158/110 96 %   04/03/18 1632 - 62 22 140/83 95 %   04/03/18 1602 - 68 25 (!) 154/98 97 %   04/03/18 1503 98 °F (36.7 °C) 69 16 164/84 98 %     Oxygen Therapy  O2 Sat (%): 95 % (04/04/18 0800)  Pulse via Oximetry: 59 beats per minute (04/04/18 0400)  O2 Device: Room air (04/04/18 0400)    Intake/Output Summary (Last 24 hours) at 04/04/18 1130  Last data filed at 04/04/18 0400   Gross per 24 hour   Intake                0 ml   Output              400 ml   Net             -400 ml       General:    Well nourished. Alert. No distress. Eyes:   Normal sclera. Extraocular movements intact. ENT:  Normocephalic, atraumatic. Moist mucous membranes  CV:   Regular rate and rhythm. No murmur, rub, or gallop. Lungs:  Clear to auscultation bilaterally. No wheezing, rhonchi, or rales. Abdomen: Soft, nontender, nondistended. Bowel sounds normal.   Extremities: Warm and dry. No cyanosis or edema. Neurologic: CN II-XII grossly intact. Sensation intact. Skin:     No rashes or jaundice. Psych:  Normal mood and affect. Discharge Info:   Current Discharge Medication List      START taking these medications    Details   atorvastatin (LIPITOR) 40 mg tablet Take 1 Tab by mouth nightly. Qty: 30 Tab, Refills: 0         CONTINUE these medications which have NOT CHANGED    Details   aspirin 81 mg chewable tablet Take 81 mg by mouth daily. latanoprost (XALATAN) 0.005 % ophthalmic solution Administer 1 Drop to both eyes nightly. timolol (TIMOPTIC-XE) 0.5 % ophthalmic gel-forming Administer 1 Drop to both eyes daily. Omega-3 Fatty Acids (FISH OIL) 500 mg cap Take 1 Caplet by mouth. Disposition: home    Activity: Activity as tolerated  Diet: DIET CARDIAC Regular    Follow-up Appointments   Procedures    FOLLOW UP VISIT Appointment in: 3 - 5 Days     Standing Status:   Standing     Number of Occurrences:   1     Order Specific Question:   Appointment in     Answer:   3 - 5 Days         Follow-up Information     Follow up With Details Comments Contact Info    Provider Unknown   Patient not available to ask      Will set up new provider In 3 days Hospital follow up and elevated blood pressure Patient will be contacted with name of provider          Time spent in patient discharge planning and coordination 35 minutes.     Discharge plan discussed with supervising MD, Dr. Felecia Andrade,   Signed:  Alicja Dorsey NP

## 2018-04-04 NOTE — PROGRESS NOTES
Problem: Mobility Impaired (Adult and Pediatric)  Goal: *Acute Goals and Plan of Care (Insert Text)  No goals set due to patient functioning at baseline. PHYSICAL THERAPY: Initial Assessment, Discharge 4/4/2018  OBSERVATION: Hospital Day: 2  [place] / [place] / Product Type: Medicare /      NAME/AGE/GENDER: Cortney Howe is a 69 y.o. male   PRIMARY DIAGNOSIS: TIA (transient ischemic attack) TIA (transient ischemic attack) TIA (transient ischemic attack)        ICD-10: Treatment Diagnosis:    · Other abnormalities of gait and mobility (R26.89)   Precaution/Allergies:  Review of patient's allergies indicates no known allergies. ASSESSMENT:     Mr. Sandoval Speak presents sitting on window seat, pleasant and agreeable for PT assessment. B LE strength and sensation intact and equal.  Patient stood independently, ambulated in hallway independently, and was able to negotiate 3 stairs with handrails independently. He was able to perform single leg stance [place] and had no loss of balance during gait, agapito functional for community ambulation. No skilled needs identified, will discontinue PT. Educated on signs and symptoms of stroke and what to do if having symptoms using the acronym F.A.S.T.   F = Face: have patient smile and if facial droop it is a sign, A = arms: have patient lift both arms, close eyes and if one side is drifting it is a sign; S = speech: have patient say a sentence and if it is slurred it is a sign; T = time to call 911 if these signs/symptoms are occurring. This section established at most recent assessment    1. RECOMMENDED REHABILITATION/EQUIPMENT: (at time of discharge pending progress): Due to the probability of continued deficits (see above) this patient will not likely need continued skilled physical therapy after discharge.   Equipment:    None at this time              HISTORY:   History of Present Injury/Illness (Reason for Referral):  Per MD note, \"73 M with PMH of glaucoma and Remote history of Seizures and PE was sent to ER from Emergency MD for TIA/Stroke workup. Pt Reports he woke up fine but later in the day started having trouble forming words. Went to  and Ct head, EKG and Labs were unremarkable. Given asa 325mg and later on Speech issues resolved. He was about to be discharged to home but the he experienced difficulty typing message on his phone and developed tunneled vision in Rt eye so he was sent to ER. EKG was done and I have reviewed that showed SR 96bpm. His symptoms resolved completely in ER. Hospitalist asked to admit for TIA/stroke workup.      Pt states he feels fine now, no speech difficulty, no new weakness or numbness, vision back to baseline. Reports he had seizures diagnosed decades ago. Also had PE in past over 15 years ago and was never started on [facility], instead took only aspirin.      10 point ROS done and is negative except as noted in HPI. \"  Past Medical History/Comorbidities:   Mr. Stanislav oCrmier  has a past medical history of Glaucoma and Pulmonary embolism ([facility] [address] Mr. Stanislav Cormier  has a past surgical history that includes hx cholecystectomy; hx vasectomy; and hx tonsillectomy. Social History/Living Environment:   Home Environment: Trailer/mobile home  # Steps to Enter: 2  One/Two Story Residence: One story  Living Alone: Yes  Support Systems: Family member(s)  Patient Expects to be Discharged to[de-identified] Private residence  Current DME Used/Available at Home: None  Prior Level of Function/Work/Activity:  Lives in mobile home, independent in home and community. Number of Personal Factors/Comorbidities that affect the Plan of Care: 0: LOW COMPLEXITY   EXAMINATION:   Most Recent Physical Functioning:   Gross Assessment:  AROM: Within functional limits  Strength:  Within functional limits  Coordination: Within functional limits  Tone: Normal  Sensation: Intact               Posture:     Balance:  Sitting: Intact  Standing: Intact Bed Mobility:     Wheelchair Mobility:     Transfers:  Sit to Stand: Independent  Stand to Sit: Independent  Bed to Chair: Independent  Gait:     Distance (ft): 200 Feet (ft)  Ambulation - Level of Assistance: Independent  Number of Stairs Trained: 3  Stairs - Level of Assistance: Modified independent  Rail Use: Both      Body Structures Involved:  1. None Body Functions Affected:  1. None Activities and Participation Affected:  1. None   Number of elements that affect the Plan of Care: 1-2: LOW COMPLEXITY   CLINICAL PRESENTATION:   Presentation: Stable and uncomplicated: LOW COMPLEXITY   CLINICAL DECISION MAKIN[address] [place] [address] AM-PAC 6 Clicks   Basic Mobility Inpatient Short Form  How much difficulty does the patient currently have. .. Unable A Lot A Little None   1. Turning over in bed (including adjusting bedclothes, sheets and blankets)? [] 1   [] 2   [] 3   [x] 4   2. Sitting down on and standing up from a chair with arms ( e.g., wheelchair, bedside commode, etc.)   [] 1   [] 2   [] 3   [x] 4   3. Moving from lying on back to sitting on the side of the bed? [] 1   [] 2   [] 3   [x] 4   How much help from another person does the patient currently need. .. Total A Lot A Little None   4. Moving to and from a bed to a chair (including a wheelchair)? [] 1   [] 2   [] 3   [x] 4   5. Need to walk in hospital room? [] 1   [] 2   [] 3   [x] 4   6. Climbing 3-5 steps with a railing? [] 1   [] 2   [] 3   [x] 4   © , Trustees of [address] [place] [address], under license to [employer] All rights reserved      Score:  Initial: 24 Most Recent: X (Date: -- )    Interpretation of Tool:  Represents activities that are increasingly more difficult (i.e. Bed mobility, Transfers, Gait). Score 24 23 22-20 19-15 14-10 9-7 6     Modifier CH CI CJ CK CL CM CN      ?  Mobility - Walking and Moving Around:     - CURRENT STATUS: CH - 0% impaired, limited or restricted    - GOAL STATUS: CH - 0% impaired, limited or restricted    - D/C STATUS:  CH - 0% impaired, limited or restricted  [place] / [place] / Product Type: Medicare /      ·    Use of outcome tool(s) and clinical judgement create a POC that gives a: Clear prediction of patient's progress: LOW COMPLEXITY            TREATMENT:   (In addition to Assessment/Re-Assessment sessions the following treatments were rendered)   Pre-treatment Symptoms/Complaints:  None. Pain: Initial:   Pain Intensity 1: 0  Post Session:  0     Assessment/Reassessment only, no treatment provided today    Braces/Orthotics/Lines/Etc:   · O2 Device: Room air  Treatment/Session Assessment:    · Response to Treatment:  Cooperative, expressed understanding.   · Interdisciplinary Collaboration:   o Physical Therapist  · After treatment position/precautions:   o Call light within reach  o Visitors at bedside  o sitting on window seat   ·   Total Treatment Duration:  PT Patient Time In/Time Out  Time In: 1100  Time Out: [address], PT, DPT

## 2018-04-04 NOTE — PROGRESS NOTES
Hospitalist Progress Note     Admit Date:  4/3/2018  2:55 PM   Name:  Norah Silva   Age:  69 y.o.  :  1940   MRN:  [id number]   PCP:  PROVIDER UNKNOWN  Treatment Team: Attending Provider: Laurel Kingston MD; Charge Nurse: Darren Fung RN    Subjective:     69 M with PMH of glaucoma and Remote history of Seizures and PE was sent to ER from Emergency MD for TIA/Stroke workup. Pt Reports he woke up fine but later in the day started having trouble forming words. Went to  and Ct head, EKG and Labs were unremarkable. Given asa 325mg and later on Speech issues resolved. He was about to be discharged to home but the he experienced difficulty typing message on his phone and developed tunneled vision in Rt eye so he was sent to ER. EKG was done and I have reviewed that showed SR 96bpm. His symptoms resolved completely in ER. Hospitalist asked to admit for TIA/stroke workup. Pt states he feels fine now, no speech difficulty, no new weakness or numbness, vision back to baseline. Reports he had seizures diagnosed decades ago. Also had PE in past over 15 years ago and was never started on [facility], instead took only aspirin. : Patient alert sitting up in bed. Patient's stroke like symptoms resolved. Patient denies  speech difficulties, new weakness, numbness, HA, CP, n/v/d. Reports vision back to baseline. Neuro checks wnl. CT and Carotid doppler unremarkable. MRI and CTA to completed today.        Objective:   Patient Vitals for the past 24 hrs:   Temp Pulse Resp BP SpO2   18 0800 99.4 °F (37.4 °C) 80 16 (!) 153/97 95 %   18 0400 97.7 °F (36.5 °C) (!) 59 16 117/78 95 %   18 0000 99.8 °F (37.7 °C) 69 17 113/69 94 %   18 1913 97.5 °F (36.4 °C) 76 18 (!) 168/92 96 %   18 1815 98.5 °F (36.9 °C) 64 20 (!) 158/110 96 %   18 1632 - 62 22 140/83 95 %   18 1602 - 68 25 (!) 154/98 97 %   18 1503 98 °F (36.7 °C) 69 16 164/84 98 %     Oxygen Therapy  O2 Sat (%): 95 % (04/04/18 0800)  Pulse via Oximetry: 59 beats per minute (04/04/18 0400)  O2 Device: Room air (04/04/18 0400)    Intake/Output Summary (Last 24 hours) at 04/04/18 0827  Last data filed at 04/04/18 0400   Gross per 24 hour   Intake                0 ml   Output              400 ml   Net             -400 ml         General:    Well nourished. Alert. CV:   RRR. No murmur, rub, or gallop. Lungs:   CTAB. No wheezing, rhonchi, or rales. Abdomen:   Soft, nontender, nondistended. Extremities: Warm and dry. No cyanosis or edema. Skin:     No rashes or jaundice. Data Review:  I have reviewed all labs, meds, telemetry events, and studies from the last 24 hours.     Recent Results (from the past 24 hour(s))   CREATININE    Collection Time: 04/03/18  4:03 PM   Result Value Ref Range    Creatinine 0.80 0.8 - 1.5 MG/DL   PLATELET COUNT    Collection Time: 04/03/18  4:03 PM   Result Value Ref Range    PLATELET 208 519 - 744 K/uL   HEMOGLOBIN    Collection Time: 04/03/18  4:03 PM   Result Value Ref Range    HGB 14.9 13.6 - 17.2 g/dL   PROTHROMBIN TIME + INR    Collection Time: 04/03/18  4:04 PM   Result Value Ref Range    Prothrombin time 12.7 11.5 - 14.5 sec    INR 1.0          All Micro Results     None          Current Meds:  Current Facility-Administered Medications   Medication Dose Route Frequency    latanoprost (XALATAN) 0.005 % ophthalmic solution 1 Drop  1 Drop Both Eyes QHS    timolol (TIMOPTIC-XE) 0.5 % ophthalmic gel-forming 1 Drop  1 Drop Both Eyes DAILY    sodium chloride (NS) flush 5-10 mL  5-10 mL IntraVENous Q8H    sodium chloride (NS) flush 5-10 mL  5-10 mL IntraVENous PRN    aspirin chewable tablet 81 mg  81 mg Oral DAILY    atorvastatin (LIPITOR) tablet 40 mg  40 mg Oral QHS    acetaminophen (TYLENOL) tablet 650 mg  650 mg Oral Q4H PRN    bisacodyl (DULCOLAX) tablet 5 mg  5 mg Oral DAILY PRN    enoxaparin (LOVENOX) injection 40 mg  40 mg SubCUTAneous Q24H    hydrALAZINE (APRESOLINE) 20 mg/mL injection 10 mg  10 mg IntraVENous Q6H PRN       Other Studies (last 24 hours):  Duplex Carotid Bilateral    Result Date: 4/4/2018  CAROTID ULTRASOUND 4/3/2018 5:23 PM CLINICAL INFORMATION: 61-year-old with history of smoking. Visual disturbance with carotid bruit. History of dyslipidemia, hypertension, speech disturbance. COMPARISON: None available. FINDINGS: Grayscale and color Doppler evaluation was performed arteries of the neck. Right common carotid velocities are as follows: Proximal velocity = 62.5 cm/sec Distal velocity = 31.2 cm/sec Velocity at the bulb = 52.0 cm/sec Internal carotid artery velocities are as follows: Proximal ICA = 43.3 cm/sec Mid ICA = 54.6 cm/sec Distal ICA = 61.6 cm/sec ICA/CCA ratio = 1.0 External carotid artery is patent. The right vertebral artery flow is antegrade. Grayscale images demonstrate minimal intimal thickening in the common carotid artery and proximal ICA. Left common carotid velocities are as follows: Proximal velocity = 125.4 cm/sec Distal velocity = 73.3 cm/sec Velocity at the bulb = 57.7 cm/sec Internal carotid artery velocities are as follows: Proximal ICA = 59.0 cm/sec Mid ICA = 45.9 cm/sec Distal ICA = 85.0 cm/sec ICA/CCA ratio = 1.2 External carotid artery is patent. The left vertebral artery flow is antegrade. Grayscale images demonstrate minimal intimal thickening in the common carotid artery. IMPRESSION: 1. No hemodynamically significant stenosis by NASCET criteria. Assessment and Plan:     Hospital Problems as of 4/4/2018  Never Reviewed          Codes Class Noted - Resolved POA    * (Principal)TIA (transient ischemic attack) ICD-10-CM: G45.9  ICD-9-CM: 435.9  4/3/2018 - Present Yes        Glaucoma ICD-10-CM: H40.9  ICD-9-CM: 365.9  4/3/2018 - Present Yes        Expressive aphasia ICD-10-CM: R47.01  ICD-9-CM: 784.3  4/3/2018 - Present Yes              PLAN:      TIA: Symptoms resolved    MRI  today.   Order TTE if MRI +  Lipitor and ASA Consider Neuro consult pending test results.      DC planning/Dispo:  Home  DVT ppx:  Lovenox    Plan of care discussed with supervising MD, Dr. Kadie Schmidt, NP

## 2018-04-04 NOTE — DISCHARGE INSTRUCTIONS
Transient Ischemic Attack: Care Instructions  Your Care Instructions    A transient ischemic attack (TIA) is when blood flow to a part of your brain is blocked for a short time. A TIA is like a stroke but usually lasts only a few minutes. A TIA does not cause lasting brain damage. Any vision problems, slurred speech, or other symptoms usually go away in 10 to 20 minutes. But they may last for up to 24 hours. TIAs are often warning signs of a stroke. Some people who have a TIA may have a stroke in the future. A stroke can cause symptoms like those of a TIA. But a stroke causes lasting damage to your brain. You can take steps to help prevent a stroke. One thing you can do is get early treatment. If you have other new symptoms, or if your symptoms do not get better, go back to the emergency room or call your doctor right away. Getting treatment right away may prevent long-term brain damage caused by a stroke. The doctor has checked you carefully, but problems can develop later. If you notice any problems or new symptoms, get medical treatment right away. Follow-up care is a key part of your treatment and safety. Be sure to make and go to all appointments, and call your doctor if you are having problems. It's also a good idea to know your test results and keep a list of the medicines you take. How can you care for yourself at home? Medicines  ? · Be safe with medicines. Take your medicines exactly as prescribed. Call your doctor if you think you are having a problem with your medicine. ? · If you take a blood thinner, such as aspirin, be sure you get instructions about how to take your medicine safely. Blood thinners can cause serious bleeding problems. ? · Call your doctor if you are not able to take your medicines for any reason.    ? · Do not take any over-the-counter medicines or herbal products without talking to your doctor first.   ? · If you take birth control pills or hormone therapy, talk to your doctor. Ask if these treatments are right for you. ? Lifestyle changes  ? · Do not smoke. If you need help quitting, talk to your doctor about stop-smoking programs and medicines. ? · Be active. If your doctor recommends it, get more exercise. Walking is a good choice. Bit by bit, increase the amount you walk every day. Try for at least 30 minutes on most days of the week. You also may want to swim, bike, or do other activities. ? · Eat heart-healthy foods. These include fruits, vegetables, high-fiber foods, fish, and foods that are low in sodium, saturated fat, and trans fat. ? · Stay at a healthy weight. Lose weight if you need to.   ? · Limit alcohol to 2 drinks a day for men and 1 drink a day for women. ?Staying healthy  ? · Manage other health problems such as diabetes, high blood pressure, and high cholesterol. ? · Get the flu vaccine every year. When should you call for help? Call 911 anytime you think you may need emergency care. For example, call if:  ? · You have new or worse symptoms of a stroke. These may include:  ¨ Sudden numbness, tingling, weakness, or loss of movement in your face, arm, or leg, especially on only one side of your body. ¨ Sudden vision changes. ¨ Sudden trouble speaking. ¨ Sudden confusion or trouble understanding simple statements. ¨ Sudden problems with walking or balance. ¨ A sudden, severe headache that is different from past headaches. Call 911 even if these symptoms go away in a few minutes. ? · You feel like you are having another TIA. ? Watch closely for changes in your health, and be sure to contact your doctor if you have any problems. Where can you learn more? Go to http://paramjit-salty.info/. Enter [phone] in the search box to learn more about \"Transient Ischemic Attack: Care Instructions. \"  Current as of: March 20, 2017  Content Version: 11.4  © 6762-4112 [employer], [employer].  Care instructions adapted under license by [employer] (which disclaims liability or warranty for this information). If you have questions about a medical condition or this instruction, always ask your healthcare professional. [address] any warranty or liability for your use of this information. DISCHARGE SUMMARY from Nurse    PATIENT INSTRUCTIONS:    After general anesthesia or intravenous sedation, for 24 hours or while taking prescription Narcotics:  · Limit your activities  · Do not drive and operate hazardous machinery  · Do not make important personal or business decisions  · Do  not drink alcoholic beverages  · If you have not urinated within 8 hours after discharge, please contact your surgeon on call. Report the following to your surgeon:  · Excessive pain, swelling, redness or odor of or around the surgical area  · Temperature over 100.5  · Nausea and vomiting lasting longer than 4 hours or if unable to take medications  · Any signs of decreased circulation or nerve impairment to extremity: change in color, persistent  numbness, tingling, coldness or increase pain  · Any questions    What to do at Home:  Recommended activity: Activity as tolerated, CARDIAC diet as tolerated. *  Please give a list of your current medications to your Primary Care Provider. *  Please update this list whenever your medications are discontinued, doses are      changed, or new medications (including over-the-counter products) are added. *  Please carry medication information at all times in case of emergency situations. These are general instructions for a healthy lifestyle:    No smoking/ No tobacco products/ Avoid exposure to second hand smoke  Surgeon General's Warning:  Quitting smoking now greatly reduces serious risk to your health.     Obesity, smoking, and sedentary lifestyle greatly increases your risk for illness    A healthy diet, regular physical exercise & weight monitoring are important for maintaining a healthy lifestyle    You may be retaining fluid if you have a history of heart failure or if you experience any of the following symptoms:  Weight gain of 3 pounds or more overnight or 5 pounds in a week, increased swelling in our hands or feet or shortness of breath while lying flat in bed. Please call your doctor as soon as you notice any of these symptoms; do not wait until your next office visit. Recognize signs and symptoms of STROKE:    F-face looks uneven    A-arms unable to move or move unevenly    S-speech slurred or non-existent    T-time-call 911 as soon as signs and symptoms begin-DO NOT go       Back to bed or wait to see if you get better-TIME IS BRAIN. Warning Signs of HEART ATTACK     Call 911 if you have these symptoms:   Chest discomfort. Most heart attacks involve discomfort in the center of the chest that lasts more than a few minutes, or that goes away and comes back. It can feel like uncomfortable pressure, squeezing, fullness, or pain.  Discomfort in other areas of the upper body. Symptoms can include pain or discomfort in one or both arms, the back, neck, jaw, or stomach.  Shortness of breath with or without chest discomfort.  Other signs may include breaking out in a cold sweat, nausea, or lightheadedness. Don't wait more than five minutes to call 911 - MINUTES MATTER! Fast action can save your life. Calling 911 is almost always the fastest way to get lifesaving treatment. Emergency Medical Services staff can begin treatment when they arrive -- up to an hour sooner than if someone gets to the hospital by car. The discharge information has been reviewed with the patient. The patient verbalized understanding. Discharge medications reviewed with the patient and appropriate educational materials and side effects teaching were provided.   ___________________________________________________________________________________________________________________________________

## 2018-12-24 NOTE — ROUTINE PROCESS
TRANSFER - OUT REPORT:    Verbal report given to [place], RN on [facility]  being transferred to [facility] for routine progression of care       Report consisted of patients Situation, Background, Assessment and   Recommendations(SBAR). Information from the following report(s) SBAR was reviewed with the receiving nurse. Lines:   Peripheral IV 04/03/18 Right Forearm (Active)   Site Assessment Clean, dry, & intact 4/3/2018  3:15 PM   Phlebitis Assessment 0 4/3/2018  3:15 PM   Infiltration Assessment 0 4/3/2018  3:15 PM   Dressing Status Clean, dry, & intact 4/3/2018  3:15 PM   Dressing Type Transparent 4/3/2018  3:15 PM   Hub Color/Line Status Pink 4/3/2018  3:15 PM        Opportunity for questions and clarification was provided.       Patient transported with:
Dr. Vargas

## 2024-02-20 ENCOUNTER — APPOINTMENT (RX ONLY)
Dept: URBAN - METROPOLITAN AREA CLINIC 66 | Facility: CLINIC | Age: 84
Setting detail: DERMATOLOGY
End: 2024-02-20

## 2024-02-20 DIAGNOSIS — L57.0 ACTINIC KERATOSIS: ICD-10-CM

## 2024-02-20 DIAGNOSIS — Z85.828 PERSONAL HISTORY OF OTHER MALIGNANT NEOPLASM OF SKIN: ICD-10-CM

## 2024-02-20 DIAGNOSIS — D18.0 HEMANGIOMA: ICD-10-CM

## 2024-02-20 DIAGNOSIS — L82.0 INFLAMED SEBORRHEIC KERATOSIS: ICD-10-CM

## 2024-02-20 DIAGNOSIS — L85.3 XEROSIS CUTIS: ICD-10-CM

## 2024-02-20 DIAGNOSIS — L81.4 OTHER MELANIN HYPERPIGMENTATION: ICD-10-CM

## 2024-02-20 DIAGNOSIS — Z71.89 OTHER SPECIFIED COUNSELING: ICD-10-CM

## 2024-02-20 DIAGNOSIS — L82.1 OTHER SEBORRHEIC KERATOSIS: ICD-10-CM

## 2024-02-20 PROBLEM — D18.01 HEMANGIOMA OF SKIN AND SUBCUTANEOUS TISSUE: Status: ACTIVE | Noted: 2024-02-20

## 2024-02-20 PROCEDURE — ? LIQUID NITROGEN

## 2024-02-20 PROCEDURE — 17110 DESTRUCTION B9 LES UP TO 14: CPT

## 2024-02-20 PROCEDURE — ? TREATMENT REGIMEN

## 2024-02-20 PROCEDURE — ? FULL BODY SKIN EXAM

## 2024-02-20 PROCEDURE — ? COUNSELING

## 2024-02-20 PROCEDURE — 99203 OFFICE O/P NEW LOW 30 MIN: CPT | Mod: 25

## 2024-02-20 PROCEDURE — 17003 DESTRUCT PREMALG LES 2-14: CPT | Mod: 59

## 2024-02-20 PROCEDURE — 17000 DESTRUCT PREMALG LESION: CPT | Mod: 59

## 2024-02-20 ASSESSMENT — LOCATION ZONE DERM
LOCATION ZONE: ARM
LOCATION ZONE: TRUNK
LOCATION ZONE: SCALP

## 2024-02-20 ASSESSMENT — LOCATION SIMPLE DESCRIPTION DERM
LOCATION SIMPLE: ABDOMEN
LOCATION SIMPLE: UPPER BACK
LOCATION SIMPLE: LEFT LOWER BACK
LOCATION SIMPLE: LEFT UPPER BACK
LOCATION SIMPLE: RIGHT OCCIPITAL SCALP
LOCATION SIMPLE: LEFT SHOULDER

## 2024-02-20 ASSESSMENT — LOCATION DETAILED DESCRIPTION DERM
LOCATION DETAILED: RIGHT SUPERIOR OCCIPITAL SCALP
LOCATION DETAILED: LEFT SUPERIOR MEDIAL MIDBACK
LOCATION DETAILED: LEFT RIB CAGE
LOCATION DETAILED: RIGHT RIB CAGE
LOCATION DETAILED: INFERIOR THORACIC SPINE
LOCATION DETAILED: LEFT INFERIOR MEDIAL UPPER BACK
LOCATION DETAILED: LEFT ANTERIOR SHOULDER

## 2024-02-20 NOTE — PROCEDURE: LIQUID NITROGEN
Show Applicator Variable?: Yes
Detail Level: Detailed
Duration Of Freeze Thaw-Cycle (Seconds): 1
Number Of Freeze-Thaw Cycles: 2 freeze-thaw cycles
Render Post-Care Instructions In Note?: no
Consent: The patient's consent was obtained including but not limited to risks of crusting, scabbing, blistering, scarring, darker or lighter pigmentary change, recurrence, incomplete removal and infection.
Post-Care Instructions: I reviewed with the patient in detail post-care instructions. Patient is to wear sunprotection, and avoid picking at any of the treated lesions. Pt may apply Vaseline to crusted or scabbing areas.
Spray Paint Text: The liquid nitrogen was applied to the skin utilizing a spray paint frosting technique.
Medical Necessity Information: It is in your best interest to select a reason for this procedure from the list below. All of these items fulfill various CMS LCD requirements except the new and changing color options.
Medical Necessity Clause: This procedure was medically necessary because the lesions that were treated were:

## 2024-02-20 NOTE — HPI: EVALUATION OF SKIN LESION(S)
What Type Of Note Output Would You Prefer (Optional)?: Standard Output
Hpi Title: Evaluation of Skin Lesions
How Severe Are Your Spot(S)?: mild
Have Your Spot(S) Been Treated In The Past?: has not been treated
Additional History: Pt has concerns on his axilla, adblmen, and back

## 2025-05-01 ENCOUNTER — APPOINTMENT (OUTPATIENT)
Dept: URBAN - METROPOLITAN AREA CLINIC 379 | Facility: CLINIC | Age: 85
Setting detail: DERMATOLOGY
End: 2025-05-01

## 2025-05-01 DIAGNOSIS — Z85.828 PERSONAL HISTORY OF OTHER MALIGNANT NEOPLASM OF SKIN: ICD-10-CM | Status: RESOLVED

## 2025-05-01 DIAGNOSIS — L60.3 NAIL DYSTROPHY: ICD-10-CM

## 2025-05-01 DIAGNOSIS — D22 MELANOCYTIC NEVI: ICD-10-CM | Status: STABLE

## 2025-05-01 DIAGNOSIS — L81.4 OTHER MELANIN HYPERPIGMENTATION: ICD-10-CM | Status: STABLE

## 2025-05-01 DIAGNOSIS — Z71.89 OTHER SPECIFIED COUNSELING: ICD-10-CM

## 2025-05-01 DIAGNOSIS — B07.8 OTHER VIRAL WARTS: ICD-10-CM

## 2025-05-01 DIAGNOSIS — L82.0 INFLAMED SEBORRHEIC KERATOSIS: ICD-10-CM

## 2025-05-01 DIAGNOSIS — L82.1 OTHER SEBORRHEIC KERATOSIS: ICD-10-CM | Status: STABLE

## 2025-05-01 DIAGNOSIS — L57.0 ACTINIC KERATOSIS: ICD-10-CM

## 2025-05-01 PROBLEM — D22.5 MELANOCYTIC NEVI OF TRUNK: Status: ACTIVE | Noted: 2025-05-01

## 2025-05-01 PROCEDURE — ? LIQUID NITROGEN

## 2025-05-01 PROCEDURE — ? COUNSELING

## 2025-05-01 PROCEDURE — ? PRESCRIPTION

## 2025-05-01 PROCEDURE — 99213 OFFICE O/P EST LOW 20 MIN: CPT | Mod: 25

## 2025-05-01 PROCEDURE — ? SUNSCREEN RECOMMENDATIONS

## 2025-05-01 PROCEDURE — 17000 DESTRUCT PREMALG LESION: CPT | Mod: 59

## 2025-05-01 PROCEDURE — ? FULL BODY SKIN EXAM

## 2025-05-01 PROCEDURE — 17110 DESTRUCTION B9 LES UP TO 14: CPT

## 2025-05-01 PROCEDURE — ? EDUCATIONAL RESOURCES PROVIDED

## 2025-05-01 RX ORDER — PHARMACY COMPOUNDING ACCESSORY
EACH MISCELLANEOUS QHS
Qty: 1 | Refills: 11 | Status: ERX | COMMUNITY
Start: 2025-05-01

## 2025-05-01 RX ORDER — UREA 40 G/100G
CREAM TOPICAL
Qty: 28.35 | Refills: 11 | Status: ERX | COMMUNITY
Start: 2025-05-01

## 2025-05-01 RX ADMIN — Medication: at 00:00

## 2025-05-01 RX ADMIN — UREA: 40 CREAM TOPICAL at 00:00

## 2025-05-01 ASSESSMENT — LOCATION DETAILED DESCRIPTION DERM
LOCATION DETAILED: LEFT LATERAL MALAR CHEEK
LOCATION DETAILED: LEFT SUPERIOR LATERAL MALAR CHEEK
LOCATION DETAILED: EPIGASTRIC SKIN
LOCATION DETAILED: RIGHT DISTAL LATERAL POSTERIOR UPPER ARM
LOCATION DETAILED: RIGHT MEDIAL SUPERIOR CHEST
LOCATION DETAILED: RIGHT MEDIAL UPPER BACK
LOCATION DETAILED: RIGHT PROXIMAL POSTERIOR UPPER ARM
LOCATION DETAILED: LEFT GREAT TOENAIL

## 2025-05-01 ASSESSMENT — LOCATION ZONE DERM
LOCATION ZONE: TRUNK
LOCATION ZONE: FACE
LOCATION ZONE: ARM
LOCATION ZONE: TOENAIL

## 2025-05-01 ASSESSMENT — LOCATION SIMPLE DESCRIPTION DERM
LOCATION SIMPLE: RIGHT UPPER ARM
LOCATION SIMPLE: ABDOMEN
LOCATION SIMPLE: LEFT CHEEK
LOCATION SIMPLE: LEFT GREAT TOE
LOCATION SIMPLE: CHEST
LOCATION SIMPLE: RIGHT UPPER BACK

## 2025-05-01 NOTE — PROCEDURE: LIQUID NITROGEN
Detail Level: Simple
Medical Necessity Information: It is in your best interest to select a reason for this procedure from the list below. All of these items fulfill various CMS LCD requirements except the new and changing color options.
Show Aperture Variable?: Yes
Render Post-Care Instructions In Note?: no
Post-Care Instructions: I reviewed with the patient in detail post-care instructions. Patient is to wear sunprotection, and avoid picking at any of the treated lesions. Pt may apply Vaseline to crusted or scabbing areas.
Number Of Freeze-Thaw Cycles: 1 freeze-thaw cycle
Medical Necessity Clause: This procedure was medically necessary because the lesions that were treated were:
Spray Paint Text: The liquid nitrogen was applied to the skin utilizing a spray paint frosting technique.
Consent: The patient's consent was obtained including but not limited to risks of crusting, scabbing, blistering, scarring, darker or lighter pigmentary change, recurrence, incomplete removal and infection.
Duration Of Freeze Thaw-Cycle (Seconds): 0